# Patient Record
Sex: FEMALE | Race: WHITE | NOT HISPANIC OR LATINO | Employment: STUDENT | URBAN - METROPOLITAN AREA
[De-identification: names, ages, dates, MRNs, and addresses within clinical notes are randomized per-mention and may not be internally consistent; named-entity substitution may affect disease eponyms.]

---
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
## 2024-03-18 ENCOUNTER — OFFICE VISIT (OUTPATIENT)
Dept: WOUND CARE | Facility: HOSPITAL | Age: 24
End: 2024-03-18
Attending: SURGERY
Payer: COMMERCIAL

## 2024-03-18 VITALS
SYSTOLIC BLOOD PRESSURE: 130 MMHG | RESPIRATION RATE: 19 BRPM | HEART RATE: 74 BPM | DIASTOLIC BLOOD PRESSURE: 83 MMHG | TEMPERATURE: 98 F

## 2024-03-18 DIAGNOSIS — T81.89XD NON-HEALING SURGICAL WOUND, SUBSEQUENT ENCOUNTER: ICD-10-CM

## 2024-03-18 DIAGNOSIS — L89.613 PRESSURE INJURY OF RIGHT HEEL, STAGE 3: Primary | ICD-10-CM

## 2024-03-18 PROBLEM — T81.89XA NONHEALING SURGICAL WOUND: Status: ACTIVE | Noted: 2024-03-18

## 2024-03-18 PROCEDURE — 99499 UNLISTED E&M SERVICE: CPT | Mod: ,,, | Performed by: SURGERY

## 2024-03-18 PROCEDURE — 11042 DBRDMT SUBQ TIS 1ST 20SQCM/<: CPT

## 2024-03-18 PROCEDURE — 99214 OFFICE O/P EST MOD 30 MIN: CPT | Mod: 25

## 2024-03-18 NOTE — PROGRESS NOTES
[facility]  Wound Care  History and Physical    Problem List Items Addressed This Visit       Pressure injury of right heel, stage 3 - Primary    Overview     23-year-old obese female who had an ankle injury about 3 months ago requiring surgery.  Patient had surgery at the end of December.  Patient required a splint for several weeks.  Patient now with full-thickness ulceration to the right heel.  Patient has been managing wound at home.  She says she has been cleaning with soap and water and alcohol.  She recently started applying antibiotic ointment and covering with a Band-Aid.  Patient no longer has this blunt but uses a cam boot.  Patient with no history of diabetes.  No significant pain no significant drainage reported         Current Assessment & Plan     Patient with full-thickness ulceration to the right heel.  Wound edges are granulating with epithelialization.  There is some nonviable tissue and slough the middle part of the wound.  No exposed bone or tendon.  No concerning drainage.  Some maceration of the periwound area.  Patient will need sharp debridement.  Will implement Mesalt dressing.  Patient will be instructed on dressing care.  The importance of offloading also stressed to the patient.         Nonhealing surgical wound    Overview     23-year-old obese female who had an ankle injury requiring surgery.  Injury occurred about 3 months ago.  Surgery was performed at that time.  Patient with a small dehiscence of the medial incision.  There is a small open wound.  No report of drainage.  No erythema.  Patient says she has been cleaning the area and applying antibiotic ointment.         Current Assessment & Plan     Small open wound at the inferior aspect of the medial incision.  Wound is full thickness.  Minimal slough present.  No sign of infection.  Sharp debridement is needed.  Implement Mesalt dressings.              History:    No past medical history on file.    No past  "surgical history on file.    No family history on file.     has no history on file for tobacco use, alcohol use, and drug use.    currently has no medications in their medication list.    Allergies:  Patient has no allergy information on record.    Review of Systems:  ROS      There were no vitals filed for this visit.      BMI:  There is no height or weight on file to calculate BMI.    Physical Exam:  Physical Exam  Obese female sitting up in bed.  Patient in no distress.    Examination of the right lower extremity reveals a right heel wound.  There is moderate slough present.  Some granulation tissue.  Epithelialization at the wound edge.  No exposed bone or tendon.  No sign of infection.  No palpable pedal pulse.  JUAN ALBERTO 0.91  Patient also with healed incision the medial aspect of the ankle.  Lateral ankle reveals a small open wound the inferior aspect of the incision.  There is moderate slough present.  No sign of infection.  A1C:  No results for input(s): "HGBA1C" in the last 2160 hours.  BMP:  No results for input(s): "GLU", "NA", "K", "CL", "CO2", "BUN", "CREATININE", "CALCIUM", "MG" in the last 2160 hours.   CBC:  No results for input(s): "WBC", "RBC", "HGB", "HCT", "PLT", "MCV", "MCH", "MCHC" in the last 2160 hours.  CMP:  No results for input(s): "GLU", "CALCIUM", "ALBUMIN", "PROT", "NA", "K", "CO2", "CL", "BUN", "ALKPHOS", "ALT", "AST", "BILITOT" in the last 2160 hours.    Invalid input(s): "CREATININ"  PREALBUMIN:  No results for input(s): "PREALBUMIN" in the last 2160 hours.  WOUND CULTURES:  No results for input(s): "LABAERO" in the last 2160 hours.        Plan:  See Wound Docs note for plan and follow up.        Jagdeep FIGUEREDO Marino [facility]    "

## 2024-03-18 NOTE — ASSESSMENT & PLAN NOTE
JUAN ALBERTO 0.91. Patient with full-thickness ulceration to the right heel.  Wound edges are granulating with epithelialization.  There is some nonviable tissue and slough the middle part of the wound.  No exposed bone or tendon.  No concerning drainage.  Some maceration of the periwound area.  Patient will need sharp debridement.  Will implement Mesalt dressing.  Patient will be instructed on dressing care.  The importance of offloading also stressed to the patient.

## 2024-03-25 ENCOUNTER — OFFICE VISIT (OUTPATIENT)
Dept: WOUND CARE | Facility: HOSPITAL | Age: 24
End: 2024-03-25
Attending: SURGERY
Payer: COMMERCIAL

## 2024-03-25 VITALS
DIASTOLIC BLOOD PRESSURE: 78 MMHG | SYSTOLIC BLOOD PRESSURE: 127 MMHG | RESPIRATION RATE: 17 BRPM | TEMPERATURE: 98 F | HEART RATE: 78 BPM

## 2024-03-25 DIAGNOSIS — L89.613 PRESSURE INJURY OF RIGHT HEEL, STAGE 3: Primary | ICD-10-CM

## 2024-03-25 DIAGNOSIS — T81.89XA NON-HEALING SURGICAL WOUND, INITIAL ENCOUNTER: ICD-10-CM

## 2024-03-25 PROCEDURE — 99499 UNLISTED E&M SERVICE: CPT | Mod: ,,, | Performed by: SURGERY

## 2024-03-25 PROCEDURE — 11042 DBRDMT SUBQ TIS 1ST 20SQCM/<: CPT

## 2024-03-25 NOTE — PROGRESS NOTES
Chief Complaint   Patient presents with    Follow-up     Diabetes [facility]  Wound Care  Progress Note    Problem List Items Addressed This Visit          Orthopedic    Pressure injury of right heel, stage 3 - Primary    Overview     23-year-old obese female who had an ankle injury about 3 months ago requiring surgery.  Patient had surgery at the end of December.  Patient required a splint for several weeks.  Patient now with full-thickness ulceration to the right heel.  Patient has been managing wound at home.  She says she has been cleaning with soap and water and alcohol.  She recently started applying antibiotic ointment and covering with a Band-Aid.  Patient no longer has this blunt but uses a cam boot.  Patient with no history of diabetes.  No significant pain no significant drainage reported         Current Assessment & Plan     Wound is much improved.    There is minimal nonviable tissue that remains.    Continue debridement to maintain active phase wound healing and remove nonviable tissue.    Offloading.    Patient can walk on the plantar surface as there should not be any pressure on the wound.  Patient currently is utilizing a walking boot as well as a knee scooter.         Nonhealing surgical wound    Overview     23-year-old obese female who had an ankle injury requiring surgery.  Injury occurred about 3 months ago.  Surgery was performed at that time.  Patient with a small dehiscence of the medial incision.  There is a small open wound.  No report of drainage.  No erythema.  Patient says she has been cleaning the area and applying antibiotic ointment.         Current Assessment & Plan     Wound is improved.    Continue debridement to remove nonviable slough and maintain active phase wound healing.            See wound doc progress notes. Documents will be scanned.        Manoj Wong  [facility]

## 2024-03-25 NOTE — ASSESSMENT & PLAN NOTE
Wound is improved.    Continue debridement to remove nonviable slough and maintain active phase wound healing.

## 2024-03-25 NOTE — ASSESSMENT & PLAN NOTE
Wound is much improved.    There is minimal nonviable tissue that remains.    Continue debridement to maintain active phase wound healing and remove nonviable tissue.    Offloading.    Patient can walk on the plantar surface as there should not be any pressure on the wound.  Patient currently is utilizing a walking boot as well as a knee scooter.

## 2024-04-01 ENCOUNTER — OFFICE VISIT (OUTPATIENT)
Dept: WOUND CARE | Facility: HOSPITAL | Age: 24
End: 2024-04-01
Attending: SURGERY
Payer: COMMERCIAL

## 2024-04-01 VITALS — DIASTOLIC BLOOD PRESSURE: 80 MMHG | HEART RATE: 86 BPM | TEMPERATURE: 98 F | SYSTOLIC BLOOD PRESSURE: 128 MMHG

## 2024-04-01 DIAGNOSIS — T81.89XD NON-HEALING SURGICAL WOUND, SUBSEQUENT ENCOUNTER: ICD-10-CM

## 2024-04-01 DIAGNOSIS — L89.613 PRESSURE INJURY OF RIGHT HEEL, STAGE 3: Primary | ICD-10-CM

## 2024-04-01 DIAGNOSIS — T81.89XA NON-HEALING SURGICAL WOUND, INITIAL ENCOUNTER: ICD-10-CM

## 2024-04-01 PROCEDURE — 99499 UNLISTED E&M SERVICE: CPT | Mod: ,,, | Performed by: SURGERY

## 2024-04-01 PROCEDURE — 11042 DBRDMT SUBQ TIS 1ST 20SQCM/<: CPT

## 2024-04-01 NOTE — ASSESSMENT & PLAN NOTE
Patient still with small wound posterior right heel.  Sharp debridement with curette was performed.  Silver nitrate applied.  I would use collagen at this point.

## 2024-04-01 NOTE — ASSESSMENT & PLAN NOTE
Wound looking better.  Sharp debridement with curette was performed to remove some subcutaneous tissue and slough.  Now ready for collagen.

## 2024-04-01 NOTE — PROGRESS NOTES
[facility]  Wound Care  Progress Note    Problem List Items Addressed This Visit       Pressure injury of right heel, stage 3 - Primary    Overview     23-year-old obese female who had an ankle injury about 3 months ago requiring surgery.  Patient had surgery at the end of December.  Patient required a splint for several weeks.  Patient now with full-thickness ulceration to the right heel.  Patient has been managing wound at home.  She says she has been cleaning with soap and water and alcohol.  She recently started applying antibiotic ointment and covering with a Band-Aid.  Patient no longer has this blunt but uses a cam boot.  Patient with no history of diabetes.  No significant pain no significant drainage reported         Current Assessment & Plan     Patient still with small wound posterior right heel.  Sharp debridement with curette was performed.  Silver nitrate applied.  I would use collagen at this point.         Nonhealing surgical wound    Overview     23-year-old obese female who had an ankle injury requiring surgery.  Injury occurred about 3 months ago.  Surgery was performed at that time.  Patient with a small dehiscence of the medial incision.  There is a small open wound.  No report of drainage.  No erythema.  Patient says she has been cleaning the area and applying antibiotic ointment.         Current Assessment & Plan     Wound looking better.  Sharp debridement with curette was performed to remove some subcutaneous tissue and slough.  Now ready for collagen.            See wound doc progress notes. Documents will be scanned.        Nicho Clayton Jr  [facility]

## 2024-04-08 ENCOUNTER — OFFICE VISIT (OUTPATIENT)
Dept: WOUND CARE | Facility: HOSPITAL | Age: 24
End: 2024-04-08
Attending: SURGERY
Payer: COMMERCIAL

## 2024-04-08 VITALS
TEMPERATURE: 99 F | SYSTOLIC BLOOD PRESSURE: 126 MMHG | DIASTOLIC BLOOD PRESSURE: 76 MMHG | HEART RATE: 81 BPM | RESPIRATION RATE: 18 BRPM

## 2024-04-08 DIAGNOSIS — L89.613 PRESSURE INJURY OF RIGHT HEEL, STAGE 3: Primary | ICD-10-CM

## 2024-04-08 DIAGNOSIS — T81.89XD NON-HEALING SURGICAL WOUND, SUBSEQUENT ENCOUNTER: ICD-10-CM

## 2024-04-08 PROCEDURE — 11042 DBRDMT SUBQ TIS 1ST 20SQCM/<: CPT

## 2024-04-08 PROCEDURE — 99499 UNLISTED E&M SERVICE: CPT | Mod: ,,, | Performed by: SURGERY

## 2024-04-08 NOTE — PROGRESS NOTES
[facility]  Wound Care  Progress Note    Problem List Items Addressed This Visit          Orthopedic    Pressure injury of right heel, stage 3 - Primary    Overview     23-year-old obese female who had an ankle injury about 3 months ago requiring surgery.  Patient had surgery at the end of December.  Patient required a splint for several weeks.  Patient now with full-thickness ulceration to the right heel.  Patient has been managing wound at home.  She says she has been cleaning with soap and water and alcohol.  She recently started applying antibiotic ointment and covering with a Band-Aid.  Patient no longer has spint but uses a cam boot.  Patient with no history of diabetes.  No significant pain no significant drainage reported         Current Assessment & Plan     Wound is much smaller.    There is a small underlying cavity.  Excisional debridement performed of nonviable slough.    Apply topical foam dressing.  May walk with Cam boot.         Nonhealing surgical wound    Overview     23-year-old obese female who had an ankle injury requiring surgery.  Injury occurred about 3 months ago.  Surgery was performed at that time.  Patient with a small dehiscence of the medial incision.  There is a small open wound.  No report of drainage.  No erythema.  Patient says she has been cleaning the area and applying antibiotic ointment.  As of 04/08/2024, the incision is closed.         Current Assessment & Plan     Wound is healed            See wound doc progress notes. Documents will be scanned.        Manoj Wong  [facility]

## 2024-04-08 NOTE — ASSESSMENT & PLAN NOTE
Wound is much smaller.    There is a small underlying cavity.  Excisional debridement performed of nonviable slough.    Apply topical foam dressing.  May walk with Cam boot.

## 2024-04-15 ENCOUNTER — OFFICE VISIT (OUTPATIENT)
Dept: WOUND CARE | Facility: HOSPITAL | Age: 24
End: 2024-04-15
Attending: SURGERY
Payer: COMMERCIAL

## 2024-04-15 VITALS
RESPIRATION RATE: 18 BRPM | HEART RATE: 76 BPM | DIASTOLIC BLOOD PRESSURE: 67 MMHG | TEMPERATURE: 98 F | SYSTOLIC BLOOD PRESSURE: 127 MMHG

## 2024-04-15 DIAGNOSIS — L89.613 PRESSURE INJURY OF RIGHT HEEL, STAGE 3: Primary | ICD-10-CM

## 2024-04-15 DIAGNOSIS — T81.89XA NON-HEALING SURGICAL WOUND, INITIAL ENCOUNTER: ICD-10-CM

## 2024-04-15 PROCEDURE — 99499 UNLISTED E&M SERVICE: CPT | Mod: ,,, | Performed by: SURGERY

## 2024-04-15 PROCEDURE — 11042 DBRDMT SUBQ TIS 1ST 20SQCM/<: CPT

## 2024-04-15 NOTE — ASSESSMENT & PLAN NOTE
Wound bed surrounded by callus.  This removed.  Wound bed healthy and granulating no sign of infection.  Will implement silver dressing

## 2024-04-15 NOTE — PROGRESS NOTES
[facility]  Wound Care  Progress Note    Problem List Items Addressed This Visit       Pressure injury of right heel, stage 3 - Primary    Overview     23-year-old obese female who had an ankle injury about 3 months ago requiring surgery.  Patient had surgery at the end of December.  Patient required a splint for several weeks.  Patient now with full-thickness ulceration to the right heel.  Patient has been managing wound at home.  She says she has been cleaning with soap and water and alcohol.  She recently started applying antibiotic ointment and covering with a Band-Aid.  Patient no longer has spint but uses a cam boot.  Patient with no history of diabetes.  No significant pain no significant drainage reported         Current Assessment & Plan     Wound bed surrounded by callus.  This removed.  Wound bed healthy and granulating no sign of infection.  Will implement silver dressing            See wound doc progress notes. Documents will be scanned.        Jagdeep Morris  [facility]

## 2024-04-22 ENCOUNTER — OFFICE VISIT (OUTPATIENT)
Dept: WOUND CARE | Facility: HOSPITAL | Age: 24
End: 2024-04-22
Attending: SURGERY
Payer: COMMERCIAL

## 2024-04-22 VITALS
HEART RATE: 78 BPM | DIASTOLIC BLOOD PRESSURE: 69 MMHG | TEMPERATURE: 99 F | RESPIRATION RATE: 18 BRPM | SYSTOLIC BLOOD PRESSURE: 124 MMHG

## 2024-04-22 DIAGNOSIS — L89.613 PRESSURE INJURY OF RIGHT HEEL, STAGE 3: Primary | ICD-10-CM

## 2024-04-22 PROBLEM — T81.89XA NONHEALING SURGICAL WOUND: Status: RESOLVED | Noted: 2024-03-18 | Resolved: 2024-04-22

## 2024-04-22 PROCEDURE — 99499 UNLISTED E&M SERVICE: CPT | Mod: ,,, | Performed by: SURGERY

## 2024-04-22 PROCEDURE — 11042 DBRDMT SUBQ TIS 1ST 20SQCM/<: CPT

## 2024-04-22 NOTE — ASSESSMENT & PLAN NOTE
Wound is slightly improved.    Continue debridement to remove nonviable slough and maintain active phase wound healing.    Continue offloading.  Utilize boot for ambulation.

## 2024-04-22 NOTE — PROGRESS NOTES
[facility]  Wound Care  Progress Note    Problem List Items Addressed This Visit          Orthopedic    Pressure injury of right heel, stage 3 - Primary    Overview     23-year-old obese female who had an ankle injury about 3 months ago requiring surgery.  Patient had surgery at the end of December.  Patient required a splint for several weeks.  Patient now with full-thickness ulceration to the right heel.  Patient has been managing wound at home.  She says she has been cleaning with soap and water and alcohol.  She recently started applying antibiotic ointment and covering with a Band-Aid.  Patient no longer has spint but uses a cam boot.  Patient with no history of diabetes.  No significant pain no significant drainage reported         Current Assessment & Plan     Wound is slightly improved.    Continue debridement to remove nonviable slough and maintain active phase wound healing.    Continue offloading.  Utilize boot for ambulation.            See wound doc progress notes. Documents will be scanned.        Manoj Wong  [facility]

## 2024-05-06 ENCOUNTER — OFFICE VISIT (OUTPATIENT)
Dept: WOUND CARE | Facility: HOSPITAL | Age: 24
End: 2024-05-06
Attending: SURGERY
Payer: COMMERCIAL

## 2024-05-06 VITALS
DIASTOLIC BLOOD PRESSURE: 72 MMHG | TEMPERATURE: 98 F | HEART RATE: 81 BPM | SYSTOLIC BLOOD PRESSURE: 121 MMHG | RESPIRATION RATE: 18 BRPM

## 2024-05-06 DIAGNOSIS — L89.613 PRESSURE INJURY OF RIGHT HEEL, STAGE 3: Primary | ICD-10-CM

## 2024-05-06 PROCEDURE — 99499 UNLISTED E&M SERVICE: CPT | Mod: ,,, | Performed by: SURGERY

## 2024-05-06 PROCEDURE — 11042 DBRDMT SUBQ TIS 1ST 20SQCM/<: CPT

## 2024-05-06 NOTE — ASSESSMENT & PLAN NOTE
Wound fairly clean.  Minimal surrounding callus formation.  No sign of infection.  Continue sharp debridement continue silver dressing

## 2024-05-06 NOTE — PROGRESS NOTES
[facility]  Wound Care  Progress Note    Problem List Items Addressed This Visit       Pressure injury of right heel, stage 3 - Primary    Overview     23-year-old obese female who had an ankle injury about 3 months ago requiring surgery.  Patient had surgery at the end of December.  Patient required a splint for several weeks.  Patient now with full-thickness ulceration to the right heel.  Patient has been managing wound at home.  She says she has been cleaning with soap and water and alcohol.  She recently started applying antibiotic ointment and covering with a Band-Aid.  Patient no longer has spint but uses a cam boot.  Patient with no history of diabetes.  No significant pain no significant drainage reported         Current Assessment & Plan     Wound fairly clean.  Minimal surrounding callus formation.  No sign of infection.  Continue sharp debridement continue silver dressing            See wound doc progress notes. Documents will be scanned.        Jagdeep Morris  [facility]

## 2024-06-10 ENCOUNTER — OFFICE VISIT (OUTPATIENT)
Dept: WOUND CARE | Facility: HOSPITAL | Age: 24
End: 2024-06-10
Attending: SURGERY
Payer: COMMERCIAL

## 2024-06-10 VITALS — HEART RATE: 80 BPM | SYSTOLIC BLOOD PRESSURE: 120 MMHG | DIASTOLIC BLOOD PRESSURE: 70 MMHG

## 2024-06-10 DIAGNOSIS — L89.613 PRESSURE INJURY OF RIGHT HEEL, STAGE 3: Primary | ICD-10-CM

## 2024-06-10 PROCEDURE — 11042 DBRDMT SUBQ TIS 1ST 20SQCM/<: CPT

## 2024-06-10 PROCEDURE — 99499 UNLISTED E&M SERVICE: CPT | Mod: ,,, | Performed by: SURGERY

## 2024-06-10 NOTE — PROGRESS NOTES
[facility]  Wound Care  Progress Note    Problem List Items Addressed This Visit          Orthopedic    Pressure injury of right heel, stage 3 - Primary    Overview     23-year-old obese female who had an ankle injury about 3 months ago requiring surgery.  Patient had surgery at the end of December.  Patient required a splint for several weeks.  Patient now with full-thickness ulceration to the right heel.  Patient has been managing wound at home.  She says she has been cleaning with soap and water and alcohol.  She recently started applying antibiotic ointment and covering with a Band-Aid.  Patient no longer has spint but uses a cam boot.  Patient with no history of diabetes.  No significant pain no significant drainage reported         Current Assessment & Plan      Wound has had limited progress.  She continues with a 5 mm depth.  Excisional debridements performed remove nonviable wound edge and slough.    Continue silver alginate.  The patient has not been following instructions.  She has been soaking the wound and applying Neosporin.  She has been redirected to proper wound care.  Continue utilize boot for offloading.            See wound doc progress notes. Documents will be scanned.        Manoj Wong  [facility]

## 2024-06-10 NOTE — ASSESSMENT & PLAN NOTE
Wound has had limited progress.  She continues with a 5 mm depth.  Excisional debridements performed remove nonviable wound edge and slough.    Continue silver alginate.  The patient has not been following instructions.  She has been soaking the wound and applying Neosporin.  She has been redirected to proper wound care.  Continue utilize boot for offloading.

## 2024-06-17 ENCOUNTER — OFFICE VISIT (OUTPATIENT)
Dept: WOUND CARE | Facility: HOSPITAL | Age: 24
End: 2024-06-17
Attending: SURGERY
Payer: COMMERCIAL

## 2024-06-17 VITALS
TEMPERATURE: 99 F | DIASTOLIC BLOOD PRESSURE: 74 MMHG | RESPIRATION RATE: 18 BRPM | SYSTOLIC BLOOD PRESSURE: 123 MMHG | HEART RATE: 78 BPM

## 2024-06-17 DIAGNOSIS — L89.613 PRESSURE INJURY OF RIGHT HEEL, STAGE 3: Primary | ICD-10-CM

## 2024-06-17 DIAGNOSIS — T81.89XA NON-HEALING SURGICAL WOUND, INITIAL ENCOUNTER: ICD-10-CM

## 2024-06-17 PROCEDURE — 11042 DBRDMT SUBQ TIS 1ST 20SQCM/<: CPT

## 2024-06-17 PROCEDURE — 99499 UNLISTED E&M SERVICE: CPT | Mod: ,,, | Performed by: SURGERY

## 2024-06-17 NOTE — PROGRESS NOTES
[facility]  Wound Care  Progress Note    Problem List Items Addressed This Visit       Pressure injury of right heel, stage 3 - Primary    Overview     24-year-old obese female who had an ankle injury requiring surgery.  Patient had surgery at the end of December.  Patient required a splint for several weeks.  Patient now with full-thickness ulceration to the right heel.  Patient has been managing wound at home.  She says she has been cleaning with soap and water and alcohol.  She recently started applying antibiotic ointment and covering with a Band-Aid.  Patient no longer has spint but uses a cam boot.  Patient with no history of diabetes.  No significant pain no significant drainage reported         Current Assessment & Plan      Wound has had limited progress.  She continues with a 5 mm depth.  Excisional debridements performed remove nonviable wound edge and slough.    Continue silver alginate.  The patient has not been following instructions.  She has been soaking the wound and applying Neosporin.  She has been redirected to proper wound care.  Continue utilize boot for offloading.          Other Visit Diagnoses       Non-healing surgical wound, initial encounter                See wound doc progress notes. Documents will be scanned.        Nicho Clayton Jr  [facility]

## 2024-07-01 ENCOUNTER — OFFICE VISIT (OUTPATIENT)
Dept: WOUND CARE | Facility: HOSPITAL | Age: 24
End: 2024-07-01
Attending: SURGERY
Payer: COMMERCIAL

## 2024-07-01 VITALS
TEMPERATURE: 98 F | HEART RATE: 75 BPM | DIASTOLIC BLOOD PRESSURE: 78 MMHG | SYSTOLIC BLOOD PRESSURE: 120 MMHG | RESPIRATION RATE: 18 BRPM

## 2024-07-01 DIAGNOSIS — L89.613 PRESSURE INJURY OF RIGHT HEEL, STAGE 3: Primary | ICD-10-CM

## 2024-07-01 PROCEDURE — 99499 UNLISTED E&M SERVICE: CPT | Mod: ,,, | Performed by: SURGERY

## 2024-07-01 PROCEDURE — 11042 DBRDMT SUBQ TIS 1ST 20SQCM/<: CPT

## 2024-07-01 NOTE — ASSESSMENT & PLAN NOTE
Wound has had limited progress.  She continues with a 3 mm depth.  Excisional debridements performed remove nonviable wound edge and slough.    Continue silver alginate.  The patient has not been following instructions.  She have anything dressing the wound today.  She has been redirected to proper wound care.  Continue utilize boot for offloading.

## 2024-07-01 NOTE — PROGRESS NOTES
[facility]  Wound Care  Progress Note    Problem List Items Addressed This Visit       Pressure injury of right heel, stage 3 - Primary    Overview     24-year-old obese female who had an ankle injury requiring surgery.  Patient had surgery at the end of December.  Patient required a splint for several weeks.  Patient now with full-thickness ulceration to the right heel.  Patient has been managing wound at home.  She says she has been cleaning with soap and water and alcohol.  She recently started applying antibiotic ointment and covering with a Band-Aid.  Patient no longer has spint but uses a cam boot.  Patient with no history of diabetes.  No significant pain no significant drainage reported         Current Assessment & Plan      Wound has had limited progress.  She continues with a 3 mm depth.  Excisional debridements performed remove nonviable wound edge and slough.    Continue silver alginate.  The patient has not been following instructions.  She have anything dressing the wound today.  She has been redirected to proper wound care.  Continue utilize boot for offloading.            See wound doc progress notes. Documents will be scanned.        Nicho Clayton Jr  [facility]

## 2024-07-15 ENCOUNTER — OFFICE VISIT (OUTPATIENT)
Dept: WOUND CARE | Facility: HOSPITAL | Age: 24
End: 2024-07-15
Attending: SURGERY
Payer: COMMERCIAL

## 2024-07-15 VITALS
RESPIRATION RATE: 18 BRPM | DIASTOLIC BLOOD PRESSURE: 67 MMHG | SYSTOLIC BLOOD PRESSURE: 115 MMHG | HEART RATE: 73 BPM | TEMPERATURE: 98 F

## 2024-07-15 DIAGNOSIS — L89.613 PRESSURE INJURY OF RIGHT HEEL, STAGE 3: Primary | ICD-10-CM

## 2024-07-15 PROCEDURE — 99499 UNLISTED E&M SERVICE: CPT | Mod: ,,, | Performed by: SURGERY

## 2024-07-15 PROCEDURE — 99213 OFFICE O/P EST LOW 20 MIN: CPT

## 2024-07-15 NOTE — PROGRESS NOTES
[facility]  Wound Care  Progress Note    Problem List Items Addressed This Visit       Pressure injury of right heel, stage 3 - Primary    Overview     24-year-old obese female who had an ankle injury requiring surgery.  Patient had surgery at the end of December.  Patient required a splint for several weeks.  Patient now with full-thickness ulceration to the right heel.  Patient has been managing wound at home.  She says she has been cleaning with soap and water and alcohol.  She recently started applying antibiotic ointment and covering with a Band-Aid.  Patient no longer has spint but uses a cam boot.  Patient with no history of diabetes.  No significant pain no significant drainage reported         Current Assessment & Plan     Wound has resolved.  Patient will be discharged from Wound Center            See wound doc progress notes. Documents will be scanned.        Jagdeep Morris  [facility]